# Patient Record
Sex: MALE | Race: WHITE
[De-identification: names, ages, dates, MRNs, and addresses within clinical notes are randomized per-mention and may not be internally consistent; named-entity substitution may affect disease eponyms.]

---

## 2018-05-21 NOTE — EDM.PDOC
ED HPI GENERAL MEDICAL PROBLEM





- General


Chief Complaint: General


Stated Complaint: TOOTH PAIN


Time Seen by Provider: 05/21/18 21:07


Source of Information: Reports: Patient


History Limitations: Reports: No Limitations





- History of Present Illness


INITIAL COMMENTS - FREE TEXT/NARRATIVE: 





The patient presents with left upper molar pain.  This started a few days ago.  

He made an appointment with a dentist tomorrow.  He has no fever or chills.


Onset: Gradual


Duration: Day(s):


Location: Reports: Face (Left upper molar pain)


Quality: Reports: Sharp


Severity: Severe


Improves with: Reports: None


Worsens with: Reports: None


Associated Symptoms: Reports: No Other Symptoms


  ** Tooth/Teeth


Pain Score (Numeric/FACES): 10





- Related Data


 Allergies











Allergy/AdvReac Type Severity Reaction Status Date / Time


 


No Known Allergies Allergy   Verified 05/21/18 21:01











Home Meds: 


 Home Meds





Hydrocodone/Acetaminophen [Hydrocodon-Acetaminophen 5-325] 1 - 2 each PO Q6HR 

PRN #20 tablet 05/21/18 [Rx]


Penicillin V Potassium 500 mg PO Q6HR #40 tab 05/21/18 [Rx]











Past Medical History





- Past Health History


Medical/Surgical History: Denies Medical/Surgical History





Social & Family History





- Tobacco Use


Smoking Status *Q: Current Every Day Smoker


Years of Tobacco use: 20


Packs/Tins Daily: 1





- Recreational Drug Use


Recreational Drug Use: No





ED ROS GENERAL





- Review of Systems


Review Of Systems: See Below


Constitutional: Reports: No Symptoms


HEENT: Reports: Dental Pain


Respiratory: Reports: No Symptoms


Cardiovascular: Reports: No Symptoms


Endocrine: Reports: No Symptoms


GI/Abdominal: Reports: No Symptoms


: Reports: No Symptoms





ED EXAM, GENERAL





- Physical Exam


Exam: See Below


Exam Limited By: No Limitations


General Appearance: Alert, No Apparent Distress


Ears: Normal External Exam


Nose: Normal Inspection


Throat/Mouth: Other (Pain upon palpation and erythema near the left upper 2nd 

molar)


Neck: Normal Inspection, Supple, Non-Tender





Course





- Vital Signs


Last Recorded V/S: 





 Last Vital Signs











Temp  98.0 F   05/21/18 21:02


 


Pulse  62   05/21/18 21:02


 


Resp  16   05/21/18 21:02


 


BP  143/96 H  05/21/18 21:02


 


Pulse Ox  99   05/21/18 21:02














Departure





- Departure


Time of Disposition: 21:15


Disposition: Home, Self-Care 01


Condition: Good


Clinical Impression: 


 Dental abscess, Pain, dental








- Discharge Information


Prescriptions: 


Hydrocodone/Acetaminophen [Hydrocodon-Acetaminophen 5-325] 1 - 2 each PO Q6HR 

PRN #20 tablet


 PRN Reason: Pain


Penicillin V Potassium 500 mg PO Q6HR #40 tab


Referrals: 


PCP,None [Primary Care Provider] - 


Additional Instructions: 


Take the medication as prescribed.  Follow up with your dentist.  Please return 

if you are worse.

## 2022-02-02 ENCOUNTER — HOSPITAL ENCOUNTER (EMERGENCY)
Dept: HOSPITAL 41 - JD.ED | Age: 37
Discharge: HOME | End: 2022-02-02
Payer: COMMERCIAL

## 2022-02-02 DIAGNOSIS — U07.1: Primary | ICD-10-CM

## 2022-02-02 DIAGNOSIS — M54.50: ICD-10-CM

## 2022-02-02 DIAGNOSIS — J06.9: ICD-10-CM

## 2022-02-02 DIAGNOSIS — Z72.0: ICD-10-CM

## 2022-02-02 PROCEDURE — 99283 EMERGENCY DEPT VISIT LOW MDM: CPT

## 2022-02-02 PROCEDURE — 96374 THER/PROPH/DIAG INJ IV PUSH: CPT

## 2022-02-02 PROCEDURE — 73502 X-RAY EXAM HIP UNI 2-3 VIEWS: CPT

## 2022-02-02 PROCEDURE — 96375 TX/PRO/DX INJ NEW DRUG ADDON: CPT

## 2022-02-02 PROCEDURE — U0002 COVID-19 LAB TEST NON-CDC: HCPCS

## 2022-02-02 PROCEDURE — 87635 SARS-COV-2 COVID-19 AMP PRB: CPT

## 2022-02-02 PROCEDURE — 72100 X-RAY EXAM L-S SPINE 2/3 VWS: CPT

## 2022-12-16 ENCOUNTER — HOSPITAL ENCOUNTER (EMERGENCY)
Dept: HOSPITAL 41 - JD.ED | Age: 37
Discharge: HOME | End: 2022-12-16
Payer: COMMERCIAL

## 2022-12-16 DIAGNOSIS — Z79.899: ICD-10-CM

## 2022-12-16 DIAGNOSIS — L05.01: Primary | ICD-10-CM

## 2022-12-16 DIAGNOSIS — F17.210: ICD-10-CM
